# Patient Record
Sex: MALE | Race: WHITE | Employment: FULL TIME | URBAN - METROPOLITAN AREA
[De-identification: names, ages, dates, MRNs, and addresses within clinical notes are randomized per-mention and may not be internally consistent; named-entity substitution may affect disease eponyms.]

---

## 2019-03-18 ENCOUNTER — OFFICE VISIT (OUTPATIENT)
Dept: CARDIOLOGY CLINIC | Age: 49
End: 2019-03-18
Payer: COMMERCIAL

## 2019-03-18 VITALS
DIASTOLIC BLOOD PRESSURE: 86 MMHG | WEIGHT: 276 LBS | HEIGHT: 69 IN | BODY MASS INDEX: 40.88 KG/M2 | RESPIRATION RATE: 16 BRPM | HEART RATE: 76 BPM | SYSTOLIC BLOOD PRESSURE: 128 MMHG | OXYGEN SATURATION: 96 %

## 2019-03-18 DIAGNOSIS — Z01.810 PREOP CARDIOVASCULAR EXAM: ICD-10-CM

## 2019-03-18 DIAGNOSIS — R06.02 SHORTNESS OF BREATH: Primary | ICD-10-CM

## 2019-03-18 PROCEDURE — 99204 OFFICE O/P NEW MOD 45 MIN: CPT | Performed by: INTERNAL MEDICINE

## 2019-03-18 ASSESSMENT — ENCOUNTER SYMPTOMS
GASTROINTESTINAL NEGATIVE: 1
STRIDOR: 0
BLOOD IN STOOL: 0
WHEEZING: 0
EYES NEGATIVE: 1
SHORTNESS OF BREATH: 1
CHEST TIGHTNESS: 0
COUGH: 0
NAUSEA: 0

## 2019-03-21 ENCOUNTER — HOSPITAL ENCOUNTER (OUTPATIENT)
Dept: NON INVASIVE DIAGNOSTICS | Age: 49
Discharge: HOME | End: 2019-03-21
Payer: COMMERCIAL

## 2019-03-21 ENCOUNTER — HOSPITAL ENCOUNTER (OUTPATIENT)
Dept: NUCLEAR MEDICINE | Age: 49
Discharge: HOME | End: 2019-03-23
Payer: COMMERCIAL

## 2019-03-21 DIAGNOSIS — Z01.810 PREOP CARDIOVASCULAR EXAM: ICD-10-CM

## 2019-03-21 DIAGNOSIS — R06.02 SHORTNESS OF BREATH: ICD-10-CM

## 2019-03-21 LAB
LV EF: 60 %
LVEF MODALITY: NORMAL

## 2019-03-21 PROCEDURE — 93306 TTE W/DOPPLER COMPLETE: CPT

## 2019-03-21 PROCEDURE — 93017 CV STRESS TEST TRACING ONLY: CPT

## 2019-03-21 PROCEDURE — 93018 CV STRESS TEST I&R ONLY: CPT | Performed by: INTERNAL MEDICINE

## 2019-03-21 PROCEDURE — 78452 HT MUSCLE IMAGE SPECT MULT: CPT

## 2019-03-21 PROCEDURE — 6360000002 HC RX W HCPCS: Performed by: INTERNAL MEDICINE

## 2019-03-21 PROCEDURE — 3430000000 HC RX DIAGNOSTIC RADIOPHARMACEUTICAL: Performed by: INTERNAL MEDICINE

## 2019-03-21 PROCEDURE — 2580000003 HC RX 258: Performed by: INTERNAL MEDICINE

## 2019-03-21 PROCEDURE — A9502 TC99M TETROFOSMIN: HCPCS | Performed by: INTERNAL MEDICINE

## 2019-03-21 RX ORDER — SODIUM CHLORIDE 0.9 % (FLUSH) 0.9 %
10 SYRINGE (ML) INJECTION PRN
Status: COMPLETED | OUTPATIENT
Start: 2019-03-21 | End: 2019-03-21

## 2019-03-21 RX ADMIN — TETROFOSMIN 36 MILLICURIE: 1.38 INJECTION, POWDER, LYOPHILIZED, FOR SOLUTION INTRAVENOUS at 10:57

## 2019-03-21 RX ADMIN — Medication 10 ML: at 08:43

## 2019-03-21 RX ADMIN — REGADENOSON 0.4 MG: 0.08 INJECTION, SOLUTION INTRAVENOUS at 10:56

## 2019-03-21 RX ADMIN — TETROFOSMIN 11.6 MILLICURIE: 1.38 INJECTION, POWDER, LYOPHILIZED, FOR SOLUTION INTRAVENOUS at 08:43

## 2019-03-21 RX ADMIN — Medication 10 ML: at 10:57

## 2019-03-21 RX ADMIN — Medication 10 ML: at 10:56

## 2019-03-22 ENCOUNTER — OFFICE VISIT (OUTPATIENT)
Dept: CARDIOLOGY CLINIC | Age: 49
End: 2019-03-22
Payer: COMMERCIAL

## 2019-03-22 VITALS
DIASTOLIC BLOOD PRESSURE: 78 MMHG | RESPIRATION RATE: 12 BRPM | HEART RATE: 71 BPM | SYSTOLIC BLOOD PRESSURE: 118 MMHG | WEIGHT: 277 LBS | OXYGEN SATURATION: 98 % | BODY MASS INDEX: 40.91 KG/M2

## 2019-03-22 DIAGNOSIS — R94.31 ABNORMAL ECG: ICD-10-CM

## 2019-03-22 DIAGNOSIS — Z01.810 PREOP CARDIOVASCULAR EXAM: Primary | ICD-10-CM

## 2019-03-22 PROCEDURE — 99213 OFFICE O/P EST LOW 20 MIN: CPT | Performed by: INTERNAL MEDICINE

## 2019-03-22 ASSESSMENT — ENCOUNTER SYMPTOMS
WHEEZING: 0
SHORTNESS OF BREATH: 0
EYES NEGATIVE: 1
RESPIRATORY NEGATIVE: 1
GASTROINTESTINAL NEGATIVE: 1
STRIDOR: 0
COUGH: 0
BLOOD IN STOOL: 0
CHEST TIGHTNESS: 0
NAUSEA: 0

## 2019-04-12 ENCOUNTER — HOSPITAL ENCOUNTER (OUTPATIENT)
Dept: PHYSICAL THERAPY | Age: 49
Setting detail: THERAPIES SERIES
Discharge: HOME | End: 2019-04-12
Payer: COMMERCIAL

## 2019-04-12 PROCEDURE — 97110 THERAPEUTIC EXERCISES: CPT

## 2019-04-12 PROCEDURE — 97162 PT EVAL MOD COMPLEX 30 MIN: CPT

## 2019-04-12 NOTE — PROGRESS NOTES
[address]  PHYSICAL THERAPY EVALUATION    Date: 2019  Patient Name: Teena Kirkland       MRN: [id number]   Account: [de-identified]   : 1970  (51 y.o.)   Gender: male   Referring Practitioner: Ailin Weinberg MD                 Diagnosis: Aftercare following joint replacement surgery; Presence of right artificial hip joint  Treatment Diagnosis: right hip pain, difficulty with ambulation  Additional Pertinent Hx: left THR, left knee repair with patella, gall bladder removal, OA  Restrictions/Precautions: Weight BearingRight Lower Extremity Weight Bearing: Weight Bearing As Tolerated    Other position/activity restrictions: hip precautions on right    Past Medical History:  has a past medical history of Unspecified sleep apnea. Past Surgical History:   has a past surgical history that includes Appendectomy; Knee arthroscopy; and Gallbladder surgery. Vital Signs  Patient Currently in Pain: No   Pain Screening  Patient Currently in Pain: No           Lives With: Spouse  Type of Home: House  ADL Assistance: Independent  Ambulation Assistance: Independent(without AD)  Transfer Assistance: Independent  Occupation: Full time employment  Type of occupation:   Additional Comments: no history of falls        Subjective:  Subjective: Patient s/p right total hip replacement on 3/26/19. Yesterday, patient was D/C from home care PT. Report recent has be ambulating without a device for short distances and using a cane for increase distances. Denies any numbness and tingling. Reports increased pain with walking, climbing, and daily activities caused pain prior to surgery. Reports good pain control past surgery.   Reports he has been maintaining hip precaution  Comments: RTD 19    Objective:   Sensation  Overall Sensation Status: WFL          Ambulation 1  Surface: carpet  Device: No Device  Assistance: Modified Independent  Quality of Gait: decreased stance be initiated when appropriate  Assessment: Body structures, Functions, Activity limitations: Decreased strength, Decreased ROM, Decreased balance, Decreased endurance, Increased Pain  Assessment: Patient s/p right THR on 3/26/19 and reports relief with OA pain. Upon PT evaluation, patient demonstrates impaired gait, decreased right hip strength and increased pain in right groin with SLR. Further skilled PT recommended to increase strength and improve gait to return to 100% level of functional mobility. Prognosis: Excellent  Discharge Recommendations: Continue to assess pending progress        Decision Making: Medium Complexity  History: gallbladder removal, OA, left THR, left knee surgery  Exam: decreased ROM, decreased strength, decreased balance, increased pain in right groin  Clinical Presentation: evolving      Plan  Frequency/Duration:  Plan  Times per week: 2  Plan weeks: 4  Current Treatment Recommendations: Strengthening, ROM, Balance Training, Functional Mobility Training, Endurance Training, Gait Training, Neuromuscular Re-education, Stair training, Manual Therapy - Soft Tissue Mobilization, Manual Therapy - Joint Manipulation, Equipment Evaluation, Education, & procurement, Modalities, Home Exercise Program, Safety Education & Training, Aquatics  Plan Comment: transfer to [facility]       G-Codes   NA    Patient Education  New Education Provided: goals of PT, HEP    POST-PAIN     Pain Rating (0-10 pain scale):   0/10  Location and pain description same as pre-treatment unless indicated. Action: [] NA  [] Call Physician  [] Perform HEP  [] Meds as prescribed    Evaluation and patient rights have been reviewed and patient agrees with plan of care.   Yes  [x]  No  []   Explain:       Joslyn Fall Risk Assessment  Risk Factor Scale  Score   History of Falls [] Yes  [x] No 25  0 0   Secondary Diagnosis [] Yes  [x] No 15  0 0   Ambulatory Aid [] Furniture  [] Crutches/cane/walker  [x] None/bedrest/wheelchair/nurse 30  15  0 0   IV/Heparin Lock [] Yes  [x] No 20  0 0   Gait/Transferring [] Impaired  [] Weak  [x] Normal/bedrest/immobile 20  10  0 0   Mental Status [] Forgets limitations  [x] Oriented to own ability 15  0 0      Total: 0     Based on the Assessment score: check the appropriate box. [x]  No intervention needed   Low =   Score of 0-24  []  Use standard prevention interventions Moderate =  Score of 24-44   [] Discuss fall prevention strategies   [] Indicate moderate falls risk on eval  []  Use high risk prevention interventions High = Score of 45 and higher   [] Discuss fall prevention strategies   [] Provide supervision during treatment time    Goals  Short term goals  Time Frame for Short term goals: 3 weeks  Short term goal 1: Patient will report 0/10 pain with ambulation for increased distances. Short term goal 2: Patient will be independent with HEP. Long term goals  Time Frame for Long term goals : 4 weeks  Long term goal 1: Patient will increase strength in right hip to 5/5 for improved tolerance to work activities. Long term goal 2: Patient will ambulate for 150ft of gait without AD with improved symmetry and step length independently. Long term goal 3: LEFS >/= 55/80 to demonstrate improved functional tolerance.          PT Individual Minutes  Time In: 0800  Time Out: 0840  Minutes: 40  Timed Code Treatment Minutes: 10 Minutes  Procedure Minutes: 30 PT evaluation minutes  Electronically signed by Wild Forrest PT on 4/12/19 at 8:59 AM

## 2019-04-12 NOTE — PLAN OF CARE
[address]     Ph: [phone]  Fax: [phone]    [] Certification  [] Recertification [x]  Plan of Care  [] Progress Note [] Discharge      To:  Referring Practitioner: José Miguel Rice MD      From:  Russell Salvador PT  Patient: Yuri Marinelli     : [birth year]  Diagnosis: Aftercare following joint replacement surgery; Presence of right artificial hip joint     Date: 2019  Treatment Diagnosis: right hip pain, difficulty with ambulation       Progress Report Period from:  2019  to 2019    Total # of Visits to Date: 1              OBJECTIVE:   Short Term Goals - Time Frame for Short term goals: 3 weeks    Goals Current/Discharge status  Met   Short term goal 1: Patient will report 0/10 pain with ambulation for increased distances. Patient reports some pain with increased distance for ambulation. [] yes  [x] no   Short term goal 2: Patient will be independent with HEP. Patient issued HEP. [] yes  [x] no     Long Term Goals - Time Frame for Long term goals : 4 weeks  Goals Current/ Discharge status Met   Long term goal 1: Patient will increase strength in right hip to 5/5 for improved tolerance to work activities. Strength RLE  Strength RLE: Exception  R Hip Flexion: 4/5  R Hip Extension: 4/5  R Hip ABduction: 4/5  R Knee Flexion: 5/5  R Knee Extension: 5/5  R Ankle Dorsiflexion: 5/5  Strength LLE  Strength LLE: Exception  L Hip Flexion: 5/5  L Hip Extension: 5/5  L Hip ABduction: 5/5  L Knee Flexion: 5/5  L Knee Extension: 5/5  L Ankle Dorsiflexion: 5/5            [] yes  [x] no   Long term goal 2: Patient will ambulate for 150ft of gait without AD with improved symmetry and step length independently.  Ambulation 1  Surface: carpet  Device: No Device  Assistance: Modified Independent  Quality of Gait: decreased stance time on right LE during gait, antalgic gait pattern  Distance: clinical distance in department     Ambulation  Ambulation?: Yes  Stairs  # Steps : 4  Stairs Height: 6\"  Rails: None  Device: No Device  Assistance: Modified independent   Comment: reciprocal pattern     [] yes  [x] no   Long term goal 3: LEFS >/= 55/80 to demonstrate improved functional tolerance. LEFS: 47/80 [] yes  [x] no       Body structures, Functions, Activity limitations: Decreased strength, Decreased ROM, Decreased balance, Decreased endurance, Increased Pain  Assessment: Patient s/p right THR on 3/26/19 and reports relief with OA pain. Upon PT evaluation, patient demonstrates impaired gait, decreased right hip strength and increased pain in right groin with SLR. Further skilled PT recommended to increase strength and improve gait to return to 100% level of functional mobility. Prognosis: Excellent  Discharge Recommendations: Continue to assess pending progress      New Education Provided: goals of PT, HEP  G-Codes   NA    PLAN: [x] Evaluate and Treat  Frequency/Duration:  Plan  Times per week: 2  Plan weeks: 4  Current Treatment Recommendations: Strengthening, ROM, Balance Training, Functional Mobility Training, Endurance Training, Gait Training, Neuromuscular Re-education, Stair training, Manual Therapy - Soft Tissue Mobilization, Manual Therapy - Joint Manipulation, Equipment Evaluation, Education, & procurement, Modalities, Home Exercise Program, Safety Education & Training, Aquatics  Plan Comment: transfer to [facility] PT     Precautions:             ?     Patient Status:[x] Continue/ Initiate plan of Care    [] Discharge PT. Recommend pt continue with HEP. [] Additional visits requested, Please re-certify for additional visits:          Signature: Electronically signed by Salo Odonnell PT on 4/12/19 at 10:03 AM      If you have any questions or concerns, please don't hesitate to call.   Thank you for your referral.    I have reviewed this plan of care and certify a need for medically necessary rehabilitation services.     Physician Signature:__________________________________________________________  Date:  Please sign and return

## 2019-04-15 ENCOUNTER — HOSPITAL ENCOUNTER (OUTPATIENT)
Dept: PHYSICAL THERAPY | Age: 49
Setting detail: THERAPIES SERIES
Discharge: HOME | End: 2019-04-15
Payer: COMMERCIAL

## 2019-04-15 PROCEDURE — 97110 THERAPEUTIC EXERCISES: CPT

## 2019-04-15 ASSESSMENT — PAIN SCALES - GENERAL: PAINLEVEL_OUTOF10: 2

## 2019-04-15 ASSESSMENT — PAIN DESCRIPTION - DESCRIPTORS: DESCRIPTORS: ACHING

## 2019-04-15 ASSESSMENT — PAIN DESCRIPTION - ORIENTATION: ORIENTATION: RIGHT

## 2019-04-15 ASSESSMENT — PAIN DESCRIPTION - LOCATION: LOCATION: HIP

## 2019-04-15 NOTE — PROGRESS NOTES
ex.  Challenged by SLR needing modification and increased reps as tolerated. Treatment Diagnosis: right hip pain, difficulty with ambulation  Prognosis: Excellent  Patient Education: Cont with HEP and ice as tolerated. Goals:  Short term goals  Time Frame for Short term goals: 3 weeks  Short term goal 1: Patient will report 0/10 pain with ambulation for increased distances. Short term goal 2: Patient will be independent with HEP. Long term goals  Time Frame for Long term goals : 4 weeks  Long term goal 1: Patient will increase strength in right hip to 5/5 for improved tolerance to work activities. Long term goal 2: Patient will ambulate for 150ft of gait without AD with improved symmetry and step length independently. Long term goal 3: LEFS >/= 55/80 to demonstrate improved functional tolerance. Progress toward goals:ongoing, working on ROM and strengthening to improve overall stability      POST-PAIN       Pain Rating (0-10 pain scale):  0 /10   Location and pain description same as pre-treatment unless indicated. Action: [x] NA   [] Perform HEP  [] Meds as prescribed  [] Modalities as prescribed   [] Call Physician     Frequency/Duration:  Plan  Times per week: 2  Plan weeks: 4  Current Treatment Recommendations: Strengthening, ROM, Balance Training, Functional Mobility Training, Endurance Training, Gait Training, Neuromuscular Re-education, Stair training, Manual Therapy - Soft Tissue Mobilization, Manual Therapy - Joint Manipulation, Equipment Evaluation, Education, & procurement, Modalities, Home Exercise Program, Safety Education & Training, Aquatics  Plan Comment: transfer to [facility] PT     Pt to continue current HEP. See objective section for any therapeutic exercise changes, additions or modifications this date.     PT Individual Minutes  Time In: 0840  Timed Code Treatment Minutes: 38 Minutes  Procedure Minutes:    Signature:  Electronically signed by [facility], PT on 4/15/19 at 9:01

## 2019-04-17 PROBLEM — Z01.810 PREOP CARDIOVASCULAR EXAM: Status: RESOLVED | Noted: 2019-03-18 | Resolved: 2019-04-17

## 2019-04-19 ENCOUNTER — HOSPITAL ENCOUNTER (OUTPATIENT)
Dept: PHYSICAL THERAPY | Age: 49
Setting detail: THERAPIES SERIES
Discharge: HOME | End: 2019-04-19
Payer: COMMERCIAL

## 2019-04-19 PROCEDURE — 97110 THERAPEUTIC EXERCISES: CPT

## 2019-04-19 ASSESSMENT — PAIN SCALES - GENERAL: PAINLEVEL_OUTOF10: 3

## 2019-04-19 ASSESSMENT — PAIN DESCRIPTION - LOCATION: LOCATION: HIP

## 2019-04-19 ASSESSMENT — PAIN DESCRIPTION - DESCRIPTORS: DESCRIPTORS: ACHING;DULL;THROBBING

## 2019-04-19 ASSESSMENT — PAIN DESCRIPTION - ORIENTATION: ORIENTATION: RIGHT

## 2019-04-19 NOTE — PROGRESS NOTES
[address] [address]  Outpatient Physical Therapy    Treatment Note        Date: 2019  Patient: Rhina Talavera  : [birth year]  ACCT #: [de-identified]  Referring Practitioner: Reece Miles MD  Diagnosis: Aftercare following joint replacement surgery; Presence of right artificial hip joint    Visit Information:  PT Visit Information  Onset Date: 19  PT Insurance Information: Aetna  Total # of Visits Approved: 05(9 eval q 180 days without authorization)  Total # of Visits to Date: 3  No Show: 0  Canceled Appointment: 0  Progress Note Counter: 3/8    Subjective: Pt reports was a little sore after last session but not too bad. Comments: RTD 19  HEP Compliance:  [x] Good [] Fair [] Poor [] Reports not doing due to:    Vital Signs  Patient Currently in Pain: Yes   Pain Screening  Patient Currently in Pain: Yes  Pain Assessment  Pain Level: 3  Pain Location: Hip  Pain Orientation: Right  Pain Descriptors: Aching;Dull; Throbbing    OBJECTIVE:   Exercises  Exercise 1: SLR x 10; right   Exercise 2: S/L hip abduction 2 x 12, (used pillow between legs due to hip precautions) right  Exercise 3: H/L hip adduction with ball 5s x 15, hip abduction with RTB x 15  Exercise 4: modified Anatoliy stretch 20s x 3  Exercise 5: prone hip extension x 10  Exercise 6: clamshells (maintain hip precautions) 2 x 12 R  Exercise 7: Scifit L3 x 5 min  Exercise 8: step ups F/L & step downs x 15 6\"  Exercise 9: rocker board x 20   Exercise 10: SLS with foam 20 sec x 3 and x15 hip hikes B  Exercise 11: sink exercises x 15  Exercise 12: bridge x 15       Modalities:  Modalities  Cryotherapy (Minutes\Location):  post exercises to decrease inflamation x 10 minutes. *Indicates exercise, modality, or manual techniques to be initiated when appropriate    Assessment:         Body structures, Functions, Activity limitations: Decreased strength, Decreased ROM, Decreased balance, Decreased endurance, Increased Pain  Assessment: Pt

## 2019-04-23 ENCOUNTER — HOSPITAL ENCOUNTER (OUTPATIENT)
Dept: PHYSICAL THERAPY | Age: 49
Setting detail: THERAPIES SERIES
Discharge: HOME | End: 2019-04-23
Payer: COMMERCIAL

## 2019-04-23 PROCEDURE — 97110 THERAPEUTIC EXERCISES: CPT

## 2019-04-23 ASSESSMENT — PAIN DESCRIPTION - DESCRIPTORS: DESCRIPTORS: ACHING

## 2019-04-23 ASSESSMENT — PAIN DESCRIPTION - ORIENTATION: ORIENTATION: RIGHT

## 2019-04-23 ASSESSMENT — PAIN SCALES - GENERAL: PAINLEVEL_OUTOF10: 2

## 2019-04-23 ASSESSMENT — PAIN DESCRIPTION - LOCATION: LOCATION: HIP

## 2019-04-23 NOTE — PROGRESS NOTES
[address] [address]  Outpatient Physical Therapy    Treatment Note        Date: 2019  Patient: Darwin Gomes  : [birth year]  ACCT #: [de-identified]  Referring Practitioner: Karolina Lozano MD  Diagnosis: Aftercare following joint replacement surgery; Presence of right artificial hip joint    Visit Information:  PT Visit Information  Onset Date: 19  PT Insurance Information: Aetna  Total # of Visits Approved: 12(6 eval q 180 days without authorization)  Total # of Visits to Date: 4  No Show: 0  Canceled Appointment: 0  Progress Note Counter:     Subjective: Pt reports thigs are improving ; Still difficulty w/ tying shoe or puttin gt socks on R   Comments: RTD 19  HEP Compliance:  [x] Good [] Fair [] Poor [] Reports not doing due to:    Vital Signs  Patient Currently in Pain: Yes   Pain Screening  Patient Currently in Pain: Yes  Pain Assessment  Pain Assessment: 0-10  Pain Level: 2  Pain Location: Hip  Pain Orientation: Right  Pain Descriptors: Aching    OBJECTIVE:   Exercises  Exercise 1: SLR x 15; right   Exercise 2: S/L hip abduction 2 x 15, (used pillow between legs due to hip precautions) right  Exercise 3: H/L hip adduction with ball 5s x 20, hip abduction with RTB x 15  Exercise 4: modified Anatoliy stretch 20s x 3  Exercise 5: prone hip extension 2 x 10  Exercise 6: clamshells (maintain hip precautions) 2 x 15 R  Exercise 7: Scifit L4 x 5 min  Exercise 8: step ups F/L & step downs x 20 6\"  Exercise 9: rocker board x 20 LRG  Exercise 10: SLS with foam 20 sec x 3 and x 20 hip hikes B  Exercise 11: sink exercises x 20  Exercise 12: bridge x 20  Exercise 13: SLR X 4 YTB x 15 B  Exercise 20: HEP: SLR, hip abduction, hip adduction with ball, hip abduction with band, modified Anatoliy stretch, SLR X 4 YTB    Modalities:  Modalities  Cryotherapy (Minutes\Location):  post exercises to decrease inflamation x 10 minutes.      *Indicates exercise, modality, or manual techniques to be initiated when appropriate    Assessment: Body structures, Functions, Activity limitations: Decreased strength, Decreased ROM, Decreased balance, Decreased endurance, Increased Pain  Assessment: Increased multiple exercises and added SLR x 4 with TB Pt stated RTW next week but hasn't received Drs approval. Suggested he contact Dr. De Marc tolerance to progressions and treatment. Treatment Diagnosis: right hip pain, difficulty with ambulation  Prognosis: Excellent  Patient Education: Continue with home exercises and treatment and increase as directed. copy of SLRx 4.    Goals:  Short term goals  Time Frame for Short term goals: 3 weeks  Short term goal 1: Patient will report 0/10 pain with ambulation for increased distances. Short term goal 2: Patient will be independent with HEP. Long term goals  Time Frame for Long term goals : 4 weeks  Long term goal 1: Patient will increase strength in right hip to 5/5 for improved tolerance to work activities. Long term goal 2: Patient will ambulate for 150ft of gait without AD with improved symmetry and step length independently. Long term goal 3: LEFS >/= 55/80 to demonstrate improved functional tolerance. Progress toward goals: LTG 1,2. POST-PAIN       Pain Rating (0-10 pain scale):   0/10   Location and pain description same as pre-treatment unless indicated.    Action: [] NA   [x] Perform HEP  [] Meds as prescribed  [] Modalities as prescribed   [] Call Physician     Frequency/Duration:  Plan  Times per week: 2  Plan weeks: 4  Current Treatment Recommendations: Strengthening, ROM, Balance Training, Functional Mobility Training, Endurance Training, Gait Training, Neuromuscular Re-education, Stair training, Manual Therapy - Soft Tissue Mobilization, Manual Therapy - Joint Manipulation, Equipment Evaluation, Education, & procurement, Modalities, Home Exercise Program, Safety Education & Training, Aquatics  Plan Comment: transfer to [facility] PT     Pt to continue current HEP.  See objective section for any therapeutic exercise changes, additions or modifications this date.          PT Individual Minutes  Time In: 0052  Time Out: 3797  Minutes: 55  Timed Code Treatment Minutes: 44 Minutes  Procedure Minutes:10    Signature:  Electronically signed by Milderd Baumgarten, PTA on 4/23/19 at 12:12 PM

## 2019-04-25 ENCOUNTER — HOSPITAL ENCOUNTER (OUTPATIENT)
Dept: PHYSICAL THERAPY | Age: 49
Setting detail: THERAPIES SERIES
Discharge: HOME | End: 2019-04-25
Payer: COMMERCIAL

## 2019-04-25 PROCEDURE — 97110 THERAPEUTIC EXERCISES: CPT

## 2019-04-25 ASSESSMENT — PAIN DESCRIPTION - ORIENTATION: ORIENTATION: RIGHT

## 2019-04-25 ASSESSMENT — PAIN DESCRIPTION - LOCATION: LOCATION: HIP

## 2019-04-25 ASSESSMENT — PAIN SCALES - GENERAL: PAINLEVEL_OUTOF10: 3

## 2019-04-25 ASSESSMENT — PAIN DESCRIPTION - DESCRIPTORS: DESCRIPTORS: ACHING;SORE

## 2019-04-25 NOTE — PROGRESS NOTES
Pain  Assessment: Increased multiple exercises and added resistance to sink exercises Pt stated RTW went well some incraese soreness. Good tolerance to progressions and treatment. Treatment Diagnosis: right hip pain, difficulty with ambulation  Prognosis: Excellent  Patient Education: Continue with home exercises and treatment and increase as directed. Goals:  Short term goals  Time Frame for Short term goals: 3 weeks  Short term goal 1: Patient will report 0/10 pain with ambulation for increased distances. Short term goal 2: Patient will be independent with HEP. Long term goals  Time Frame for Long term goals : 4 weeks  Long term goal 1: Patient will increase strength in right hip to 5/5 for improved tolerance to work activities. Long term goal 2: Patient will ambulate for 150ft of gait without AD with improved symmetry and step length independently. Long term goal 3: LEFS >/= 55/80 to demonstrate improved functional tolerance. Progress toward goals:LTG 1,2. POST-PAIN       Pain Rating (0-10 pain scale):   3/10   Location and pain description same as pre-treatment unless indicated. Action: [] NA   [x] Perform HEP  [] Meds as prescribed  [] Modalities as prescribed   [] Call Physician     Frequency/Duration:  Plan  Times per week: 2  Plan weeks: 4  Current Treatment Recommendations: Strengthening, ROM, Balance Training, Functional Mobility Training, Endurance Training, Gait Training, Neuromuscular Re-education, Stair training, Manual Therapy - Soft Tissue Mobilization, Manual Therapy - Joint Manipulation, Equipment Evaluation, Education, & procurement, Modalities, Home Exercise Program, Safety Education & Training, Aquatics  Plan Comment: transfer to [facility] PT     Pt to continue current HEP. See objective section for any therapeutic exercise changes, additions or modifications this date.          PT Individual Minutes  Time In: 8285  Time Out: 8235  Minutes: 57  Timed Code Treatment Minutes: 03 Minutes  Procedure Minutes:10    Signature:  Electronically signed by Dee Dee Escamilla PTA on 4/25/19 at 9:05 AM

## 2019-04-29 ENCOUNTER — APPOINTMENT (OUTPATIENT)
Dept: PHYSICAL THERAPY | Age: 49
End: 2019-04-29
Payer: COMMERCIAL

## 2019-05-02 ENCOUNTER — APPOINTMENT (OUTPATIENT)
Dept: PHYSICAL THERAPY | Age: 49
End: 2019-05-02
Payer: COMMERCIAL

## 2019-05-03 ENCOUNTER — HOSPITAL ENCOUNTER (OUTPATIENT)
Dept: PHYSICAL THERAPY | Age: 49
Setting detail: THERAPIES SERIES
Discharge: HOME | End: 2019-05-03
Payer: COMMERCIAL

## 2019-05-03 PROCEDURE — 97110 THERAPEUTIC EXERCISES: CPT

## 2019-05-03 ASSESSMENT — PAIN SCALES - GENERAL: PAINLEVEL_OUTOF10: 0

## 2019-05-03 NOTE — PROGRESS NOTES
[id number] [address]  Outpatient Physical Therapy    Treatment Note        Date: 5/3/2019  Patient: Yuri Marinelli  :   ACCT #: [de-identified]  Referring Practitioner: José Miguel Rice MD  Diagnosis: Aftercare following joint replacement surgery; Presence of right artificial hip joint    Visit Information:  PT Visit Information  Onset Date: 19  PT Insurance Information: Aetna  Total # of Visits Approved: 67(9 eval q 180 days without authorization)  Total # of Visits to Date: 6  No Show: 0  Canceled Appointment: 0  Progress Note Counter:     Subjective: Pt reports pain 5/10 at worst in last 5 days with prolonged WB. Pt self reports 90% function at this time. Reports continued use of sock aide on R to don socks. Comments: RTD 19  HEP Compliance:  [x] Good [] Fair [] Poor [] Reports not doing due to:    Vital Signs  Patient Currently in Pain: Denies   Pain Screening  Patient Currently in Pain: Denies  Pain Assessment  Pain Level: 0    OBJECTIVE:   Exercises  Exercise 1: SLR with 1# wt and VC for improved eccentric control x 20 right   Exercise 2: S/L hip series 1# wt x15 ea, (used pillow between legs due to hip precautions) right  Exercise 4: modified Anatoliy stretch 20s x 3  Exercise 5: prone hip extension 1# wt 2 x 15  Exercise 6: clamshells (maintain hip precautions) 2 x 20 R  Exercise 7: Nustep L-4 x 5 min  Exercise 8: step ups F/L & step downs x 25 8\"  Exercise 12: PBall bridge 5 sec x 20  Exercise 13: 4 way hip YTB x 20 B  Exercise 14:  Wall squats 3s x 15    Ambulation 1  Surface: carpet  Device: No Device  Assistance: Independent  Quality of Gait: Mildly decreased R stance time though grossly steady  Distance: clinical distance in department    Strength: [] NT  [x] MMT completed:  Strength RLE  R Hip Flexion: 5/5  R Hip Extension: 4+/5  R Hip ABduction: 4+/5    Modalities:  Modalities  Cryotherapy (Minutes\Location): Declined     *Indicates exercise, modality, or manual techniques Comment: transfer to [facility] PT     Pt to continue current HEP. See objective section for any therapeutic exercise changes, additions or modifications this date.          PT Individual Minutes  Time In: 0565  Time Out: 7552  Minutes: 39  Timed Code Treatment Minutes: 39 Minutes  Procedure Minutes: 0    Activity Minutes Units   Ther Ex 39 3         Signature:  Electronically signed by Nubia Valentine PTA on 5/3/19 at 8:33 AM

## 2019-05-03 NOTE — PROGRESS NOTES
[address]     Ph: [phone]  Fax: [phone]    [] Certification  [] Recertification []  Plan of Care  [x] Progress Note [] Discharge      To:  Nigel Jasmine MD      From:  Uriel Castellanos, PT  Patient: Erum Lui     :   Diagnosis: Aftercare following joint replacement surgery; Presence of right artificial hip joint     Date: 5/3/2019  Treatment Diagnosis: right hip pain, difficulty with ambulation       Progress Report Period from: 2019  to 5/3/2019    Total # of Visits to Date: 6   No Show: 0    Canceled Appointment: 0     OBJECTIVE:   Short Term Goals - Time Frame for Short term goals: 3 weeks    Goals Current/Discharge status  Met   Short term goal 1: Patient will report 0/10 pain with ambulation for increased distances. Pt reports pain 5/10 at worst in last 5 days with prolonged WB [] yes  [] no   Short term goal 2: Patient will be independent with HEP. Pt reports compliance with HEP [] yes  [] no     Long Term Goals - Time Frame for Long term goals : 4 weeks  Goals Current/ Discharge status Met   Long term goal 1: Patient will increase strength in right hip to 5/5 for improved tolerance to work activities. Strength RLE  R Hip Flexion: 5/5  R Hip Extension: 4+/5  R Hip ABduction: 4+/5   [] yes  [] no   Long term goal 2: Patient will ambulate for 150ft of gait without AD with improved symmetry and step length independently. Ambulation 1  Surface: carpet  Device: No Device  Assistance: Independent  Quality of Gait: Mildly decreased R stance time though grossly steady  Distance: clinical distance in department   [] yes  [] no   Long term goal 3: LEFS >/= 55/80 to demonstrate improved functional tolerance.  LEFS = 69/80   [] yes  [] no       Body structures, Functions, Activity limitations: Decreased strength, Decreased ROM, Decreased balance, Decreased endurance, Increased Pain  Assessment: Pt demonstrates improved strength through hips since beginning skilled therapy. Slef reports 90% function at this time with report of 86.25% function via LEFS. Pt reports continued diffiulty with prolonged WB and ambulation. Specific instructions for Next Treatment: Probable DC at end of current POC (2 more visits)  Prognosis: Excellent      PLAN: [x] Cont with POC  Frequency/Duration:  Plan  Times per week: 2  Plan weeks: 4  Specific instructions for Next Treatment: Probable DC at end of current POC (2 more visits)  Current Treatment Recommendations: Strengthening, ROM, Balance Training, Functional Mobility Training, Endurance Training, Gait Training, Neuromuscular Re-education, Stair training, Manual Therapy - Soft Tissue Mobilization, Manual Therapy - Joint Manipulation, Equipment Evaluation, Education, & procurement, Modalities, Home Exercise Program, Safety Education & Training, Aquatics  Plan Comment: transfer to Sonny Reveles PT                     Patient Status:[x] Continue/ Initiate plan of Care    [] Discharge PT. Recommend pt continue with HEP. [] Additional visits requested, Please re-certify for additional visits:          Signature: Objective information by: Electronically signed by Evan Cancino PTA on 5/3/19 at 8:04 AM  Electronically signed by Sonny Reveles PT on 5/3/2019 at 11:15 AM      If you have any questions or concerns, please don't hesitate to call. Thank you for your referral.    I have reviewed this plan of care and certify a need for medically necessary rehabilitation services.     Physician Signature:__________________________________________________________  Date:  Please sign and return

## 2019-05-07 ENCOUNTER — APPOINTMENT (OUTPATIENT)
Dept: PHYSICAL THERAPY | Age: 49
End: 2019-05-07
Payer: COMMERCIAL

## 2019-05-10 ENCOUNTER — HOSPITAL ENCOUNTER (OUTPATIENT)
Dept: PHYSICAL THERAPY | Age: 49
Setting detail: THERAPIES SERIES
Discharge: HOME | End: 2019-05-10
Payer: COMMERCIAL

## 2019-05-10 PROCEDURE — 97110 THERAPEUTIC EXERCISES: CPT

## 2019-05-10 NOTE — PROGRESS NOTES
[address]     Ph: [phone]  Fax: [phone]    [] Certification  [] Recertification []  Plan of Care  [] Progress Note [x] Discharge      To:  Referring Practitioner: Dusty Boswell MD      From:  Cordelia Deleon PT  Patient: Juli Valdez     : [id number]  Diagnosis: Aftercare following joint replacement surgery; Presence of right artificial hip joint     Date: 5/10/2019  Treatment Diagnosis: right hip pain, difficulty with ambulation       Progress Report Period from:  2019  to 5/10/2019    Total # of Visits to Date: 7   No Show: 0    Canceled Appointment: 0     OBJECTIVE:   Short Term Goals - Time Frame for Short term goals: 3 weeks    Goals Current/Discharge status  Met   Short term goal 1: Patient will report 0/10 pain with ambulation for increased distances. Amb 500' + without device normal gait. Pt reports pain 3/10 at worst in last 5 days with prolonged amb. [x] partially  [] no   Short term goal 2: Patient will be independent with HEP. Pt independent with HEP. [x] yes  [] no     Long Term Goals - Time Frame for Long term goals : 4 weeks  Goals Current/ Discharge status Met   Long term goal 1: Patient will increase strength in right hip to 5/5 for improved tolerance to work activities. Strength RLE  R Hip Flexion: 5/5  R Hip Extension: 4+/5  R Hip ABduction: 4+/5  R Knee Flexion: 5/5  R Knee Extension: 5/5  R Ankle Dorsiflexion: 5/5     [x] yes  [x] nohip ext/abd   Long term goal 2: Patient will ambulate for 150ft of gait without AD with improved symmetry and step length independently. Amb 500' + without device normal gait. Pt reports pain 3/10 at worst in last 5 days with prolonged amb. [x] yes  [] no   Long term goal 3: LEFS >/= 55/80 to demonstrate improved functional tolerance.  76/80 LEFS [x] yes  [] no       Body structures, Functions, Activity limitations: Decreased strength, Decreased ROM, Decreased balance, Decreased endurance, Increased Pain  Assessment: Pt independent with HEP. Amb 500' + without device normal gait. Prognosis: Excellent      New Education Provided: Continue with home exercises        PLAN: [x]DC PT with HEP  Frequency/Duration:  Plan  Plan Comment: Discharged                ? Patient Status:[] Continue/ Initiate plan of Care    [x] Discharge PT. Recommend pt continue with HEP. [] Additional visits requested, Please re-certify for additional visits:          Signature: Objective measurements done by Electronically signed by Cr Martinez PTA on 5/10/19 at 8:35 AM    Electronically signed by Olivia Russell PT on 5/14/2019 at 3:15 PM    If you have any questions or concerns, please don't hesitate to call. Thank you for your referral.    I have reviewed this plan of care and certify a need for medically necessary rehabilitation services.     Physician Signature:__________________________________________________________  Date:  Please sign and return

## 2019-05-10 NOTE — PROGRESS NOTES
Decreased ROM, Decreased balance, Decreased endurance, Increased Pain  Assessment: Pt independent with HEP. Amb 500' + without device normal gait. Treatment Diagnosis: right hip pain, difficulty with ambulation  Prognosis: Excellent  Patient Education: Continue with home exercises     Goals:  Short term goals  Time Frame for Short term goals: 3 weeks  Short term goal 1: Patient will report 0/10 pain with ambulation for increased distances. Short term goal 2: Patient will be independent with HEP. Long term goals  Time Frame for Long term goals : 4 weeks  Long term goal 1: Patient will increase strength in right hip to 5/5 for improved tolerance to work activities. Long term goal 2: Patient will ambulate for 150ft of gait without AD with improved symmetry and step length independently. Long term goal 3: LEFS >/= 55/80 to demonstrate improved functional tolerance. Progress toward goals:DischaRGED    POST-PAIN       Pain Rating (0-10 pain scale):  0 /10   Location and pain description same as pre-treatment unless indicated. Action: [] NA   [x] Perform HEP  [] Meds as prescribed  [] Modalities as prescribed   [] Call Physician     Frequency/Duration:  Plan  Plan Comment: Discharged      Pt to continue current HEP. See objective section for any therapeutic exercise changes, additions or modifications this date.          PT Individual Minutes  Time In: 1312  Time Out: 1362  Minutes: 38  Timed Code Treatment Minutes: 38 Minutes  Procedure Minutes:na    Signature:  Electronically signed by Ash Cooper PTA on 5/10/19 at 8:35 AM